# Patient Record
Sex: FEMALE | Race: WHITE | NOT HISPANIC OR LATINO | Employment: OTHER | URBAN - METROPOLITAN AREA
[De-identification: names, ages, dates, MRNs, and addresses within clinical notes are randomized per-mention and may not be internally consistent; named-entity substitution may affect disease eponyms.]

---

## 2017-01-31 ENCOUNTER — GENERIC CONVERSION - ENCOUNTER (OUTPATIENT)
Dept: OTHER | Facility: OTHER | Age: 82
End: 2017-01-31

## 2018-01-13 NOTE — MISCELLANEOUS
Message  pt  called; went to [facility] for Rx for Temazepam and was not there; I called and gave verbal order to the pharmacist for the 15 mg  strength, # 30 one every hs prn; I returned call to pt  to inform her Rx called in      Plan  Menopausal symptoms    · From  Temazepam 15 MG Oral Capsule TAKE 1 CAPSULE AT BEDTIME AS  NEEDED FOR SLEEP To Temazepam 15 MG Oral Capsule TAKE ONE CAPSULE BY  MOUTH AT BEDTIME AS NEEDED FOR SLEEP    Signatures   Electronically signed by : Blayne Berrios DO; May 16 2016  4:04PM EST                       (Author)

## 2018-01-15 NOTE — PROGRESS NOTES
Chief Complaint  The patient called in looking for a refill on her Temazepam and per Dr Mitchell Pinto the prescription was printed out for the patient to come   I had to leave a message for her to let her know that the prescription is ready to   Active Problems    1  Anemia (285 9) (D64 9)   2  Eye irritation (379 99) (H57 8)   3  Menopausal symptoms (627 2) (N95 1)   4  Osteoporosis (733 00) (M81 0)   5  Skin rash (782 1) (R21)   6  Urinary frequency (788 41) (R35 0)    Current Meds   1  Losartan Potassium 50 MG Oral Tablet; Therapy: 85BQA3726 to (Last Rx:82Swb5750)  Requested for: 43Lla1212 Ordered   2  Naproxen 500 MG Oral Tablet; Therapy: 99ZRT5032 to (Last Rx:19Nue4447)  Requested for: 28Xgp0750 Ordered   3  Promethazine VC/Codeine 6 25-5-10 MG/5ML Oral Syrup; TAKE 5 ML EVERY 4 TO 6   HOURS AS NEEDED; Therapy: 82PDN8833 to (Last Rx:43Lcf1934) Ordered   4  Sertraline HCl - 50 MG Oral Tablet; TAKE 1 TABLET DAILY AS DIRECTED; Therapy: 74TZL1411 to (Evaluate:29Dec2012)  Requested for: 89KJA4702; Last   Rx:29Nov2012 Ordered   5  Temazepam 15 MG Oral Capsule; TAKE ONE CAPSULE BY MOUTH AT BEDTIME AS   NEEDED FOR SLEEP; Therapy: 39VTC8596 to (Evaluate:31Jan2017); Last Rx:02Nov2016 Ordered   6  Triamcinolone Acetonide 0 1 % External Cream; APPLY  AND RUB  IN A THIN FILM TO   AFFECTED AREAS TWICE DAILY  (AM AND PM); Therapy: 06GRC3165 to (Last Rx:01Cct6529) Ordered    Allergies    1   Sulfa Antibiotics    Plan  Menopausal symptoms    · Temazepam 15 MG Oral Capsule; TAKE ONE CAPSULE BY MOUTH AT  BEDTIME AS NEEDED FOR SLEEP    Signatures   Electronically signed by : Yahir Chavez DO; Jan 31 2017  5:04PM EST                       (Author)

## 2018-01-25 RX ORDER — TEMAZEPAM 15 MG/1
1 CAPSULE ORAL
COMMUNITY
Start: 2015-11-18 | End: 2018-01-26 | Stop reason: SDUPTHER

## 2018-01-26 DIAGNOSIS — F51.01 PRIMARY INSOMNIA: Primary | ICD-10-CM

## 2018-01-26 RX ORDER — TEMAZEPAM 15 MG/1
15 CAPSULE ORAL
Qty: 30 CAPSULE | Refills: 0 | Status: SHIPPED | OUTPATIENT
Start: 2018-01-26 | End: 2018-02-28 | Stop reason: SDUPTHER

## 2018-01-26 RX ORDER — TEMAZEPAM 15 MG/1
15 CAPSULE ORAL
Qty: 30 CAPSULE | Refills: 1 | Status: CANCELLED | OUTPATIENT
Start: 2018-01-26

## 2018-02-28 DIAGNOSIS — F51.01 PRIMARY INSOMNIA: ICD-10-CM

## 2018-02-28 RX ORDER — TEMAZEPAM 15 MG/1
15 CAPSULE ORAL
Qty: 90 CAPSULE | Refills: 0 | Status: SHIPPED | OUTPATIENT
Start: 2018-02-28 | End: 2018-05-30 | Stop reason: SDUPTHER

## 2018-04-23 ENCOUNTER — OFFICE VISIT (OUTPATIENT)
Dept: UROLOGY | Facility: MEDICAL CENTER | Age: 83
End: 2018-04-23
Payer: COMMERCIAL

## 2018-04-23 VITALS
DIASTOLIC BLOOD PRESSURE: 68 MMHG | BODY MASS INDEX: 20.22 KG/M2 | SYSTOLIC BLOOD PRESSURE: 130 MMHG | HEIGHT: 60 IN | WEIGHT: 103 LBS

## 2018-04-23 DIAGNOSIS — N30.01 ACUTE CYSTITIS WITH HEMATURIA: Primary | ICD-10-CM

## 2018-04-23 LAB
SL AMB  POCT GLUCOSE, UA: NEGATIVE
SL AMB LEUKOCYTE ESTERASE,UA: NEGATIVE
SL AMB POCT BILIRUBIN,UA: NEGATIVE
SL AMB POCT BLOOD,UA: ABNORMAL
SL AMB POCT CLARITY,UA: CLEAR
SL AMB POCT COLOR,UA: YELLOW
SL AMB POCT KETONES,UA: NEGATIVE
SL AMB POCT NITRITE,UA: NEGATIVE
SL AMB POCT PH,UA: 5
SL AMB POCT SPECIFIC GRAVITY,UA: <=1.005
SL AMB POCT URINE PROTEIN: NEGATIVE
SL AMB POCT UROBILINOGEN: 0.2

## 2018-04-23 PROCEDURE — 81003 URINALYSIS AUTO W/O SCOPE: CPT | Performed by: UROLOGY

## 2018-04-23 PROCEDURE — 99213 OFFICE O/P EST LOW 20 MIN: CPT | Performed by: UROLOGY

## 2018-04-23 RX ORDER — UBIDECARENONE 75 MG
1 CAPSULE ORAL
COMMUNITY
Start: 2014-09-16

## 2018-04-23 RX ORDER — IPRATROPIUM BROMIDE 21 UG/1
SPRAY, METERED NASAL
COMMUNITY
Start: 2018-03-19

## 2018-04-23 RX ORDER — LISINOPRIL 20 MG/1
20 TABLET ORAL
COMMUNITY
Start: 2017-05-05 | End: 2019-08-09 | Stop reason: ALTCHOICE

## 2018-04-23 NOTE — PROGRESS NOTES
[address] [facility]  [facility]  [address] [facility], [address] [place] [address]  [phone]  www  [facility]  org      NAME: Timbo Berrios  AGE: 80 y o  SEX: female  : 1933   MRN: [id number]    DATE: 2018  TIME: 2:44 PM    Assessment and Plan:   Chronic cystitis with an episode of acute cystitis recently, now resolving  However this was quite symptomatically severe  As she is slowly getting better, we will simply watch and wait and I wish to see her in 3 months to check another urinalysis  If she still has given a slight amount of blood in her urine or she still has some symptoms, I will do a cystoscopy  She has not smoked for many years  Chief Complaint   No chief complaint on file  History of Present Illness   Chronic cystitis:  Urinalysis negative except for trace blood  MRI 2017 showed normal urinary tract  Had UTI 4/10/2018- E COli, sensitive to MCN and Cipro, which she was treated with both by Dr Carvin Baumgarten- took a while for it to resolve  Her symptoms were severe bladder burning, "chills" and frequency, and this is all calming down  Before all of this, she was doing quite well, last seen in   ALLERGIES: Sulfa       MEDICATIONS: Calcium + Vit D3 1 tablet PO Daily   Lisinopril 20 mg tablet 1 tablet PO Daily   Restoril 30 mg capsule 1 capsule PO Daily   Vitamin B12 1 tablet PO Daily         PSH: Cystoscopy -         PSH Notes: tooth extraction 2 yrs ago     NON- PSH: Hip Replacement, Right - about   Revise Hip Joint Replacement, Right - 2015  Sleeve Lobectomy -           PMH: Cystitis Chronic, Interstitial - 2015  Cystitis, Chronic - 2015, -   Dysuria, ?? CAUSE - BLADDER LOOKS NORMAL, UA CLEAR   MAY HAVE GYN ISSUE - 2015  Frequency of Urination - 2015, (Worsening), -   Urgency Of Urination (Worsening) -   Incomplete Bladder Emptying -   Hematuria, Microscopic - 2012, - 2012  Retention Of Urine Ot        PMH Notes: pt has open ended glaucoma  she called eye dr 6/3/14     NON- PMH: Chills (Without Fever)  Cough  Depression  Glaucoma, open angled glaucoma pt called her eye  6/3/2014/ Khalida Barnes  Headache  Hearing Loss Unspec  Hypertension  Lung Cancer  Skin Sensation Disturb       FAMILY HISTORY: Breast Cancer - Mother  Lung Cancer - Father     SOCIAL HISTORY: Marital Status:   Patient does not smoke anymore  Has not smoked since 9/18/2002  Smoked for 40 years  Does not use smokeless tobacco   Drinks 1 drink per day  Types of alcohol consumed: Beer  Does not use drugs  Drinks 1 caffeinated drink per day  Has not had a blood transfusion  The following portions of the patient's history were reviewed and updated as appropriate: allergies, current medications, past family history, past medical history, past social history, past surgical history and problem list     Review of Systems   Review of Systems    Active Problem List   There is no problem list on file for this patient  Objective   There were no vitals taken for this visit      Physical Exam        Current Medications     Current Outpatient Prescriptions:     temazepam (RESTORIL) 15 mg capsule, Take 1 capsule (15 mg total) by mouth daily at bedtime for 90 days, Disp: 90 capsule, Rfl: 0        Davie Owens MD

## 2018-04-23 NOTE — LETTER
2018     Referral [facility]    Patient: Mimi Guevara   YOB: 1933   Date of Visit: 2018       Dear Dr Herberth Donis:    Thank you for referring Danii Duke to me for evaluation  Below are my notes for this consultation  If you have questions, please do not hesitate to call me  I look forward to following your patient along with you  Sincerely,        Keshia Fabian MD        CC: MD Keshia Gilliam MD  2018  3:07 PM  Sign at close encounter  [address] [facility]  [address], [address] [place] [address]  [phone]  www  [facility]  org      NAME: Mimi Guevara  AGE: 80 y o  SEX: female  : 1933   MRN: [id number]    DATE: 2018  TIME: 2:44 PM    Assessment and Plan:   Chronic cystitis with an episode of acute cystitis recently, now resolving  However this was quite symptomatically severe  As she is slowly getting better, we will simply watch and wait and I wish to see her in 3 months to check another urinalysis  If she still has given a slight amount of blood in her urine or she still has some symptoms, I will do a cystoscopy  She has not smoked for many years  Chief Complaint   No chief complaint on file  History of Present Illness   Chronic cystitis:  Urinalysis negative except for trace blood  MRI 2017 showed normal urinary tract  Had UTI 4/10/2018- E COli, sensitive to MCN and Cipro, which she was treated with both by Dr Aissatou Tobias- took a while for it to resolve  Her symptoms were severe bladder burning, "chills" and frequency, and this is all calming down  Before all of this, she was doing quite well, last seen in 2016          ALLERGIES: Sulfa       MEDICATIONS: Calcium + Vit D3 1 tablet PO Daily   Lisinopril 20 mg tablet 1 tablet PO Daily   Restoril 30 mg capsule 1 capsule PO Daily   Vitamin B12 1 tablet PO Daily         PSH: Cystoscopy - 2012        [address] Notes: tooth extraction 2 yrs ago     NON- PSH: Hip Replacement, Right - about 2009  Revise Hip Joint Replacement, Right - 9/14/2015  Sleeve Lobectomy - 2002          PMH: Cystitis Chronic, Interstitial - 4/2/2015  Cystitis, Chronic - 4/2/2015, - 2012  Dysuria, ?? CAUSE - BLADDER LOOKS NORMAL, UA CLEAR  MAY HAVE GYN ISSUE - 1/2/2015  Frequency of Urination - 1/2/2015, (Worsening), - 2014  Urgency Of Urination (Worsening) - 2014  Incomplete Bladder Emptying - 2014  Hematuria, Microscopic - 2012, - 2012  Retention Of Urine Ot        PMH Notes: pt has open ended glaucoma  she called eye dr 6/3/14     NON- PMH: Chills (Without Fever)  Cough  Depression  Glaucoma, open angled glaucoma pt called her eye dr 6/3/2014/ Mevelyn Osmond  Headache  Hearing Loss Unspec  Hypertension  Lung Cancer  Skin Sensation Disturb       FAMILY HISTORY: Breast Cancer - Mother  Lung Cancer - Father     SOCIAL HISTORY: Marital Status:   Patient does not smoke anymore  Has not smoked since 9/18/2002  Smoked for 40 years  Does not use smokeless tobacco   Drinks 1 drink per day  Types of alcohol consumed: Beer  Does not use drugs  Drinks 1 caffeinated drink per day  Has not had a blood transfusion  The following portions of the patient's history were reviewed and updated as appropriate: allergies, current medications, past family history, past medical history, past social history, past surgical history and problem list     Review of Systems   Review of Systems    Active Problem List   There is no problem list on file for this patient  Objective   There were no vitals taken for this visit      Physical Exam        Current Medications     Current Outpatient Prescriptions:     temazepam (RESTORIL) 15 mg capsule, Take 1 capsule (15 mg total) by mouth daily at bedtime for 90 days, Disp: 90 capsule, Rfl: 0        Jabier Smith MD

## 2018-05-30 DIAGNOSIS — F51.01 PRIMARY INSOMNIA: ICD-10-CM

## 2018-05-30 RX ORDER — TEMAZEPAM 15 MG/1
15 CAPSULE ORAL
Qty: 90 CAPSULE | Refills: 0 | Status: SHIPPED | OUTPATIENT
Start: 2018-05-30 | End: 2018-05-30 | Stop reason: SDUPTHER

## 2018-05-30 RX ORDER — TEMAZEPAM 15 MG/1
15 CAPSULE ORAL
Qty: 90 CAPSULE | Refills: 0 | Status: SHIPPED | OUTPATIENT
Start: 2018-05-30 | End: 2018-08-29 | Stop reason: SDUPTHER

## 2018-08-24 DIAGNOSIS — F51.01 PRIMARY INSOMNIA: ICD-10-CM

## 2018-08-24 RX ORDER — TEMAZEPAM 15 MG/1
15 CAPSULE ORAL
Qty: 90 CAPSULE | Refills: 0 | OUTPATIENT
Start: 2018-08-24 | End: 2018-11-22

## 2018-08-27 DIAGNOSIS — F51.01 PRIMARY INSOMNIA: ICD-10-CM

## 2018-08-27 RX ORDER — TEMAZEPAM 15 MG/1
15 CAPSULE ORAL
Qty: 90 CAPSULE | Refills: 0 | OUTPATIENT
Start: 2018-08-27 | End: 2018-11-25

## 2018-08-29 DIAGNOSIS — F51.01 PRIMARY INSOMNIA: ICD-10-CM

## 2018-08-29 RX ORDER — TEMAZEPAM 15 MG/1
15 CAPSULE ORAL
Qty: 90 CAPSULE | Refills: 0 | Status: SHIPPED | OUTPATIENT
Start: 2018-08-29 | End: 2018-10-22 | Stop reason: SDUPTHER

## 2018-10-22 DIAGNOSIS — F51.01 PRIMARY INSOMNIA: ICD-10-CM

## 2018-10-23 DIAGNOSIS — F51.01 PRIMARY INSOMNIA: ICD-10-CM

## 2018-10-23 RX ORDER — TEMAZEPAM 15 MG/1
15 CAPSULE ORAL
Qty: 90 CAPSULE | Refills: 1 | Status: SHIPPED | OUTPATIENT
Start: 2018-10-23 | End: 2018-10-23 | Stop reason: SDUPTHER

## 2018-10-23 RX ORDER — TEMAZEPAM 15 MG/1
15 CAPSULE ORAL
Qty: 90 CAPSULE | Refills: 0 | Status: SHIPPED | OUTPATIENT
Start: 2018-10-23 | End: 2018-11-19 | Stop reason: SDUPTHER

## 2018-11-15 ENCOUNTER — OFFICE VISIT (OUTPATIENT)
Dept: UROLOGY | Facility: MEDICAL CENTER | Age: 83
End: 2018-11-15

## 2018-11-15 VITALS
HEIGHT: 60 IN | WEIGHT: 105 LBS | BODY MASS INDEX: 20.62 KG/M2 | SYSTOLIC BLOOD PRESSURE: 130 MMHG | DIASTOLIC BLOOD PRESSURE: 72 MMHG

## 2018-11-15 DIAGNOSIS — N30.01 ACUTE CYSTITIS WITH HEMATURIA: Primary | ICD-10-CM

## 2018-11-15 DIAGNOSIS — N30.20 CHRONIC CYSTITIS: ICD-10-CM

## 2018-11-15 LAB
SL AMB  POCT GLUCOSE, UA: ABNORMAL
SL AMB LEUKOCYTE ESTERASE,UA: ABNORMAL
SL AMB POCT BILIRUBIN,UA: ABNORMAL
SL AMB POCT BLOOD,UA: ABNORMAL
SL AMB POCT CLARITY,UA: CLEAR
SL AMB POCT COLOR,UA: YELLOW
SL AMB POCT KETONES,UA: ABNORMAL
SL AMB POCT NITRITE,UA: ABNORMAL
SL AMB POCT PH,UA: 6
SL AMB POCT SPECIFIC GRAVITY,UA: 1.01
SL AMB POCT URINE PROTEIN: ABNORMAL
SL AMB POCT UROBILINOGEN: 0.2

## 2018-11-15 PROCEDURE — 81003 URINALYSIS AUTO W/O SCOPE: CPT | Performed by: UROLOGY

## 2018-11-15 PROCEDURE — 99213 OFFICE O/P EST LOW 20 MIN: CPT | Performed by: UROLOGY

## 2018-11-15 RX ORDER — NITROFURANTOIN 25; 75 MG/1; MG/1
100 CAPSULE ORAL 2 TIMES DAILY
Qty: 14 CAPSULE | Refills: 0 | Status: SHIPPED | OUTPATIENT
Start: 2018-11-15 | End: 2019-10-03

## 2018-11-15 NOTE — LETTER
November 15, 2018     Kaleigh Davis MD  [address] [facility] [address]    Patient: Av Heller   YOB: 1933   Date of Visit: 11/15/2018       Dear Dr Busch Lines: Thank you for referring Dusty Amanda to me for evaluation  Below are my notes for this consultation  If you have questions, please do not hesitate to call me  I look forward to following your patient along with you  Sincerely,        Mikael Dudley MD        CC: No Recipients  Mikael Dudley MD  11/15/2018  1:21 PM  Sign at close encounter  [address] [facility]  [address]  [phone]  www  [facility]  org      NAME: Av Heller  AGE: 80 y o  SEX: female  : 1933   MRN: [id number]    DATE: 11/15/2018  TIME: 1:14 PM    Assessment and Plan:  Difficulty urinating after hip replacement 6 days ago  This may be related to the epidural itself, but she also may have a urinary tract infection  We are waiting on a culture from [place] [id number]  We will start her empirically on Macrodantin 100 mg p  O  B i d  For 7 days  Hopefully with this and time her urination pattern will improve back to its baseline  Chief Complaint   No chief complaint on file  History of Present Illness   Chronic cystitis- here for f/u wt recheck of urine  She had her hip replaced 6 days ago and she is having intermittent urinary retention  Eventually she is able to urinate but she finds herself unable to urinate at certain times  [facility]  During the surgery she had an epidural but she had no Pulido catheter  Urinalysis today shows large leukocyte esterase and trace blood        The following portions of the patient's history were reviewed and updated as appropriate: allergies, current medications, past family history, past medical history, past social history, past surgical history and problem list     Review of Systems   Review of Systems   Genitourinary: Positive for difficulty urinating  Active Problem List   There is no problem list on file for this patient        Objective   /72 (BP Location: Left arm, Patient Position: Sitting)   Ht 5' (1 524 m)   Wt 47 6 kg (105 lb)   BMI 20 51 kg/m²      Physical Exam        Current Medications     Current Outpatient Prescriptions:     Brinzolamide-Brimonidine (SIMBRINZA) 1-0 2 % SUSP, Apply 1 drop to eye, Disp: , Rfl:     cyanocobalamin (VITAMIN B-12) 100 mcg tablet, Take 1 mg by mouth, Disp: , Rfl:     FLUZONE HIGH-DOSE 0 5 ML JIM, , Disp: , Rfl:     losartan (COZAAR) 100 MG tablet, , Disp: , Rfl:     temazepam (RESTORIL) 15 mg capsule, Take 1 capsule (15 mg total) by mouth daily at bedtime for 90 days, Disp: 90 capsule, Rfl: 0    ciprofloxacin (CIPRO) 500 mg tablet, , Disp: , Rfl:     ipratropium (ATROVENT) 0 03 % nasal spray, , Disp: , Rfl:     lisinopril (ZESTRIL) 20 mg tablet, Take 20 mg by mouth, Disp: , Rfl:     magnesium hydroxide (MILK OF MAGNESIA) 400 mg/5 mL oral suspension, Take 30 mL by mouth daily, Disp: , Rfl:     nitrofurantoin (MACROBID) 100 mg capsule, , Disp: , Rfl:     sertraline (ZOLOFT) 50 mg tablet, TAKE ONE AND ONE-HALF TABLETS BY MOUTH ONCE DAILY , Disp: , Rfl:         Russ Longoria MD

## 2018-11-15 NOTE — PROGRESS NOTES
[address] [facility]  [facility]  [address] [facility]  [place], [address] [facility]  [phone]  www  [facility]  org      NAME: Vince Jean  AGE: 80 y o  SEX: female  : 1933   MRN: [id number]    DATE: 11/15/2018  TIME: 1:14 PM    Assessment and Plan:  Difficulty urinating after hip replacement 6 days ago  This may be related to the epidural itself, but she also may have a urinary tract infection  We are waiting on a culture from [place] [id number]  We will start her empirically on Macrodantin 100 mg p  O  B i d  For 7 days  Hopefully with this and time her urination pattern will improve back to its baseline  Chief Complaint   No chief complaint on file  History of Present Illness   Chronic cystitis- here for f/u wt recheck of urine  She had her hip replaced 6 days ago and she is having intermittent urinary retention  Eventually she is able to urinate but she finds herself unable to urinate at certain times  A urine culture was sent from [facility] yesterday  During the surgery she had an epidural but she had no Pulido catheter  Urinalysis today shows large leukocyte esterase and trace blood  The following portions of the patient's history were reviewed and updated as appropriate: allergies, current medications, past family history, past medical history, past social history, past surgical history and problem list     Review of Systems   Review of Systems   Genitourinary: Positive for difficulty urinating  Active Problem List   There is no problem list on file for this patient        Objective   /72 (BP Location: Left arm, Patient Position: Sitting)   Ht 5' (1 524 m)   Wt 47 6 kg (105 lb)   BMI 20 51 kg/m²     Physical Exam        Current Medications     Current Outpatient Prescriptions:     Brinzolamide-Brimonidine (SIMBRINZA) 1-0 2 % SUSP, Apply 1 drop to eye, Disp: , Rfl:     cyanocobalamin (VITAMIN B-12) 100 mcg tablet, Take 1 mg by mouth, Disp: , Rfl:     FLUZONE HIGH-DOSE 0 5 ML JIM, , Disp: , Rfl:     losartan (COZAAR) 100 MG tablet, , Disp: , Rfl:     temazepam (RESTORIL) 15 mg capsule, Take 1 capsule (15 mg total) by mouth daily at bedtime for 90 days, Disp: 90 capsule, Rfl: 0    ciprofloxacin (CIPRO) 500 mg tablet, , Disp: , Rfl:     ipratropium (ATROVENT) 0 03 % nasal spray, , Disp: , Rfl:     lisinopril (ZESTRIL) 20 mg tablet, Take 20 mg by mouth, Disp: , Rfl:     magnesium hydroxide (MILK OF MAGNESIA) 400 mg/5 mL oral suspension, Take 30 mL by mouth daily, Disp: , Rfl:     nitrofurantoin (MACROBID) 100 mg capsule, , Disp: , Rfl:     sertraline (ZOLOFT) 50 mg tablet, TAKE ONE AND ONE-HALF TABLETS BY MOUTH ONCE DAILY , Disp: , Rfl:         Delphine Lira MD

## 2018-11-19 DIAGNOSIS — F51.01 PRIMARY INSOMNIA: ICD-10-CM

## 2018-11-20 RX ORDER — TEMAZEPAM 15 MG/1
15 CAPSULE ORAL
Qty: 90 CAPSULE | Refills: 0 | Status: SHIPPED | OUTPATIENT
Start: 2018-11-20 | End: 2019-08-09 | Stop reason: ALTCHOICE

## 2019-08-09 ENCOUNTER — ANNUAL EXAM (OUTPATIENT)
Dept: OBGYN CLINIC | Facility: MEDICAL CENTER | Age: 84
End: 2019-08-09
Payer: COMMERCIAL

## 2019-08-09 VITALS
HEIGHT: 60 IN | SYSTOLIC BLOOD PRESSURE: 120 MMHG | BODY MASS INDEX: 19.48 KG/M2 | DIASTOLIC BLOOD PRESSURE: 80 MMHG | WEIGHT: 99.2 LBS

## 2019-08-09 DIAGNOSIS — Z01.419 ENCNTR FOR GYN EXAM (GENERAL) (ROUTINE) W/O ABN FINDINGS: Primary | ICD-10-CM

## 2019-08-09 DIAGNOSIS — Z12.31 ENCOUNTER FOR SCREENING MAMMOGRAM FOR MALIGNANT NEOPLASM OF BREAST: ICD-10-CM

## 2019-08-09 PROCEDURE — S0612 ANNUAL GYNECOLOGICAL EXAMINA: HCPCS | Performed by: OBSTETRICS & GYNECOLOGY

## 2019-08-09 RX ORDER — TEMAZEPAM 30 MG/1
30 CAPSULE ORAL DAILY PRN
COMMUNITY
Start: 2019-04-01

## 2019-08-09 NOTE — PROGRESS NOTES
ASSESSMENT & PLAN: Heraclio Stringer was seen today for gynecologic exam     Diagnoses and all orders for this visit:    Encntr for gyn exam (general) (routine) w/o abn findings    Encounter for screening mammogram for malignant neoplasm of breast  -     Mammo screening bilateral w 3d & cad; Future    Discussion/Summary:  Patient here for yearly gyn preventive exam; doing well  1   Routine well woman exam done today  2   Pap and HPV:  Pap and cotesting was not done today  Current ASCCP Guidelines reviewed  3   Mammogram was ordered  4   Colonoscopy is up to date  5   DEXA is up to date  DEXA was not ordered today  6  The following were reviewed in today's visit: breast self exam, adequate intake of calcium and vitamin D, exercise and healthy diet  7   F/u 1year  CC:  Annual Gynecologic Examination    HPI: Gildardo Valladares is a 80 y o  who presents for annual gynecologic examination  She has the following concerns:  none    Health Maintenance:    Patient describes her health as good  Patient does not have weight concerns  She exercises 7 days per week with house work and gardening  She does wear her seatbelt routinely  She does perform regular monthly self breast exams  She does feel safe at home  Patients does not follow a  diet  Patient gets 5 servings of dairy or calcium rich foods daily  Last pap: age 72  Last mammogram: 2018  Last colonoscopy: unknown date    There is no problem list on file for this patient  Past Medical History:   Diagnosis Date    Lung cancer [facility])        Past Surgical History:   Procedure Laterality Date    LUNG LOBECTOMY Left 06/2001    TOTAL HIP ARTHROPLASTY Right 04/2016       Past OB/Gyn History:    Patient is not currently sexually active  No family history on file  Social History:   Social History     Socioeconomic History    Marital status:       Spouse name: Not on file    Number of children: Not on file    Years of education: Not on file  Highest education level: Not on file   Occupational History    Not on file   Social Needs    Financial resource strain: Not on file    Food insecurity:     Worry: Not on file     Inability: Not on file    Transportation needs:     Medical: Not on file     Non-medical: Not on file   Tobacco Use    Smoking status: Former Smoker    Smokeless tobacco: Never Used   Substance and Sexual Activity    Alcohol use: Yes     Alcohol/week: 1 0 standard drinks     Types: 1 Cans of beer per week    Drug use: No    Sexual activity: Not on file   Lifestyle    Physical activity:     Days per week: Not on file     Minutes per session: Not on file    Stress: Not on file   Relationships    Social connections:     Talks on phone: Not on file     Gets together: Not on file     Attends [employer] service: Not on file     Active member of club or organization: Not on file     Attends meetings of clubs or organizations: Not on file     Relationship status: Not on file    Intimate partner violence:     Fear of current or ex partner: Not on file     Emotionally abused: Not on file     Physically abused: Not on file     Forced sexual activity: Not on file   Other Topics Concern    Not on file   Social History Narrative    Not on file     Presently lives with 80 y o  high school school classmate from [employer]  Patient is currently retired       Allergies   Allergen Reactions    Pollen Extract Itching    Sulfa Antibiotics Itching         Current Outpatient Medications:     temazepam (RESTORIL) 30 mg capsule, Take 30 mg by mouth daily as needed, Disp: , Rfl:     Brinzolamide-Brimonidine (SIMBRINZA) 1-0 2 % SUSP, Apply 1 drop to eye, Disp: , Rfl:     cyanocobalamin (VITAMIN B-12) 100 mcg tablet, Take 1 mg by mouth, Disp: , Rfl:     FLUZONE HIGH-DOSE 0 5 ML JIM, , Disp: , Rfl:     ipratropium (ATROVENT) 0 03 % nasal spray, , Disp: , Rfl:     magnesium hydroxide (MILK OF MAGNESIA) 400 mg/5 mL oral suspension, Take 30 mL by mouth daily, Disp: , Rfl:     nitrofurantoin (MACROBID) 100 mg capsule, Take 1 capsule (100 mg total) by mouth 2 (two) times a day, Disp: 14 capsule, Rfl: 0    sertraline (ZOLOFT) 50 mg tablet, TAKE ONE AND ONE-HALF TABLETS BY MOUTH ONCE DAILY , Disp: , Rfl:     Review of Systems  Constitutional :no fever, feels well, no tiredness, no recent weight gain or loss  ENT: no ear ache, no loss of hearing, no nosebleeds or nasal discharge, no sore throat or hoarseness  Cardiovascular: no complaints of slow or fast heart beat, no chest pain, no palpitations, no leg claudication or lower extremity edema  Respiratory: no complaints of shortness of shortness of breath, no ESCOBAR  Breasts:no complaints of breast pain, breast lump, or nipple discharge  Gastrointestinal: no complaints of abdominal pain, constipation, nausea, vomiting, or diarrhea or bloody stools  Genitourinary : no complaints of dysuria, incontinence, pelvic pain, no dysmenorrhea, vaginal discharge or abnormal vaginal bleeding and as noted in HPI  Musculoskeletal: no complaints of arthralgia, no myalgia, no joint swelling or stiffness, no limb pain or swelling  Integumentary: no complaints of skin rash or lesion, itching or dry skin  Neurological: no complaints of headache, no confusion, no numbness or tingling, no dizziness or fainting     Physical Exam:   /80   Ht 5' (1 524 m)   Wt 45 kg (99 lb 3 2 oz)   LMP  (LMP Unknown)   Breastfeeding? No   BMI 19 37 kg/m²     General: appears stated age, cooperative, alert normal mood and affect   Psychiatric oriented to person, place and time  Mood and affect normal   Neck: normal, supple,trachea midline, no masses    Thyroid: normal, no thyromegaly   Heart: regular rate and rhythm, S1, S2 normal, no murmur, click, rub or gallop   Lungs: clear to auscultation bilaterally, no increased work of breathing or signs of respiratory distress   Breasts: normal, no dimpling or skin changes noted   Abdomen: soft, non-tender, without masses or organomegaly   Vulva: normal , no lesions   Vagina: normal , no lesions or dryness   Urethra: normal   Urethal meatus normal   Bladder Normal, soft, non-tender and no prolapse or masses appreciated   Cervix: normal, no palpable masses    Uterus: normal , non-tender, not enlarged, no palpable masses   Adnexa: normal, non-tender without fullness or masses; hemoccult neg  Lymphatic Palpation of lymph nodes in neck, axilla, groin and/or other locations: no lymphadenopathy or masses noted   Skin Normal skin turgor and no rashes    Palpation of skin and subcutaneous tissue normal

## 2019-10-03 ENCOUNTER — OFFICE VISIT (OUTPATIENT)
Dept: UROLOGY | Facility: MEDICAL CENTER | Age: 84
End: 2019-10-03
Payer: COMMERCIAL

## 2019-10-03 VITALS
BODY MASS INDEX: 19.12 KG/M2 | HEIGHT: 60 IN | WEIGHT: 97.4 LBS | SYSTOLIC BLOOD PRESSURE: 112 MMHG | DIASTOLIC BLOOD PRESSURE: 64 MMHG | HEART RATE: 89 BPM

## 2019-10-03 DIAGNOSIS — R39.9 UTI SYMPTOMS: Primary | ICD-10-CM

## 2019-10-03 LAB
BACTERIA UR QL AUTO: NORMAL /HPF
BILIRUB UR QL STRIP: NEGATIVE
CLARITY UR: CLEAR
COLOR UR: YELLOW
GLUCOSE UR STRIP-MCNC: NEGATIVE MG/DL
HGB UR QL STRIP.AUTO: NEGATIVE
HYALINE CASTS #/AREA URNS LPF: NORMAL /LPF
KETONES UR STRIP-MCNC: NEGATIVE MG/DL
LEUKOCYTE ESTERASE UR QL STRIP: NEGATIVE
NITRITE UR QL STRIP: NEGATIVE
NON-SQ EPI CELLS URNS QL MICRO: NORMAL /HPF
PH UR STRIP.AUTO: 6 [PH]
PROT UR STRIP-MCNC: NEGATIVE MG/DL
RBC #/AREA URNS AUTO: NORMAL /HPF
SP GR UR STRIP.AUTO: 1.01 (ref 1–1.03)
UROBILINOGEN UR QL STRIP.AUTO: 0.2 E.U./DL
WBC #/AREA URNS AUTO: NORMAL /HPF

## 2019-10-03 PROCEDURE — 87086 URINE CULTURE/COLONY COUNT: CPT | Performed by: PHYSICIAN ASSISTANT

## 2019-10-03 PROCEDURE — 81001 URINALYSIS AUTO W/SCOPE: CPT | Performed by: PHYSICIAN ASSISTANT

## 2019-10-03 PROCEDURE — 99213 OFFICE O/P EST LOW 20 MIN: CPT | Performed by: PHYSICIAN ASSISTANT

## 2019-10-03 RX ORDER — NITROFURANTOIN 25; 75 MG/1; MG/1
100 CAPSULE ORAL 2 TIMES DAILY
COMMUNITY
End: 2019-10-29

## 2019-10-03 RX ORDER — MELATONIN
1000 DAILY
COMMUNITY

## 2019-10-03 NOTE — PROGRESS NOTES
10/3/2019      Chief Complaint   Patient presents with    Urinary Tract Infection       Assessment and Plan    80 y o  female managed by Dr Skyla Levy    1  Dysuria  - patient was on Keflex x 8 days and saw no benefit so they switched Macorbid Tuesday, still no improvement  - will obtain urine culture to ensure she does not have a multidrug resistant UTI, will continue on Macrobid in the meantime  - if so, will treat appropriately  - if not, will have to consider ultrasound to rule out any structural abnormalities to account for the patient's symptoms, can also treat symptomatically with overactive bladder medicines  - educated the patient to call with any worsening signs or symptoms of infection including but not limited to nausea, vomiting, fever, chills, and gross hematuria    Will call the patient with the results of her urine testing and appropriate follow-up      History of Present Illness  Anuel Arriola is a 80 y o  female here for a possible urinary tract infection  The patient was having dysuria and frequency so she was started on Keflex  This was ineffective after 1 weeks with a switch her to [address]  Unfortunately, the patient did not have a urine culture  She continues with dysuria and frequency  Denies any nausea, vomiting, fever, or chills  She does state that she has had headaches recently  Otherwise, doing well  She has had urinary tract infections in the past       Review of Systems   Constitutional: Negative for activity change, chills and fever  Gastrointestinal: Negative for abdominal distention and abdominal pain  Musculoskeletal: Negative for back pain and gait problem  Psychiatric/Behavioral: Negative for behavioral problems and confusion           Past Medical History  Past Medical History:   Diagnosis Date    Lung cancer [facility])        Past Social History  Past Surgical History:   Procedure Laterality Date    LUNG LOBECTOMY Left 06/2001    TOTAL HIP ARTHROPLASTY Right 04/2016 Social History     Tobacco Use   Smoking Status Former Smoker   Smokeless Tobacco Never Used       Past Family History  History reviewed  No pertinent family history  Past Social history  Social History     Socioeconomic History    Marital status:      Spouse name: Not on file    Number of children: Not on file    Years of education: Not on file    Highest education level: Not on file   Occupational History    Not on file   Social Needs    Financial resource strain: Not on file    Food insecurity:     Worry: Not on file     Inability: Not on file    Transportation needs:     Medical: Not on file     Non-medical: Not on file   Tobacco Use    Smoking status: Former Smoker    Smokeless tobacco: Never Used   Substance and Sexual Activity    Alcohol use:  Yes     Alcohol/week: 1 0 standard drinks     Types: 1 Cans of beer per week    Drug use: No    Sexual activity: Not on file   Lifestyle    Physical activity:     Days per week: Not on file     Minutes per session: Not on file    Stress: Not on file   Relationships    Social connections:     Talks on phone: Not on file     Gets together: Not on file     Attends Baptism service: Not on file     Active member of club or organization: Not on file     Attends meetings of clubs or organizations: Not on file     Relationship status: Not on file    Intimate partner violence:     Fear of current or ex partner: Not on file     Emotionally abused: Not on file     Physically abused: Not on file     Forced sexual activity: Not on file   Other Topics Concern    Not on file   Social History Narrative    Not on file       Current Medications  Current Outpatient Medications   Medication Sig Dispense Refill    Brinzolamide-Brimonidine (SIMBRINZA) 1-0 2 % SUSP Apply 1 drop to eye      cholecalciferol (VITAMIN D3) 1,000 units tablet Take 1,000 Units by mouth daily      cyanocobalamin (VITAMIN B-12) 100 mcg tablet Take 1 mg by mouth      FLUZONE HIGH-DOSE 0 5 ML JIM       ipratropium (ATROVENT) 0 03 % nasal spray       nitrofurantoin (MACROBID) 100 mg capsule Take 100 mg by mouth 2 (two) times a day      sertraline (ZOLOFT) 50 mg tablet TAKE ONE AND ONE-HALF TABLETS BY MOUTH ONCE DAILY       temazepam (RESTORIL) 30 mg capsule Take 30 mg by mouth daily as needed       No current facility-administered medications for this visit  Allergies  Allergies   Allergen Reactions    Pollen Extract Itching    Sulfa Antibiotics Itching         The following portions of the patient's history were reviewed and updated as appropriate: allergies, current medications, past medical history, past social history, past surgical history and problem list       Vitals  Vitals:    10/03/19 1250   BP: 112/64   BP Location: Left arm   Patient Position: Sitting   Cuff Size: Standard   Pulse: 89   Weight: 44 2 kg (97 lb 6 4 oz)   Height: 5' (1 524 m)       Physical Exam  Constitutional   General appearance: Patient is seated and in no acute distress, well appearing and well nourished  Head and Face   Head and face: Normal     Eyes   Conjunctiva and lids: No erythema, swelling or discharge  Ears, Nose, Mouth, and Throat   Hearing: Normal     Pulmonary   Respiratory effort: No increased work of breathing or signs of respiratory distress  Cardiovascular   Examination of extremities for edema and/or varicosities: Normal     Abdomen   Abdomen: Non-tender, no masses  Musculoskeletal   Gait and station: Normal     Skin   Skin and subcutaneous tissue: Warm, dry, and intact  No visible lesions or rashes  Psychiatric   Judgment and insight: Normal  Recent and remote memory:  Normal  Mood and affect: Normal      Results  No results found for this or any previous visit (from the past 1 hour(s))  ]  No results found for: PSA  No results found for: GLUCOSE, CALCIUM, NA, K, CO2, CL, BUN, CREATININE  No results found for: WBC, HGB, HCT, MCV, PLT      Orders  No orders of the defined types were placed in this encounter

## 2019-10-04 LAB — BACTERIA UR CULT: NORMAL

## 2019-10-07 ENCOUNTER — TELEPHONE (OUTPATIENT)
Dept: UROLOGY | Facility: MEDICAL CENTER | Age: 84
End: 2019-10-07

## 2019-10-07 NOTE — TELEPHONE ENCOUNTER
Patient seen by Maia Gomez on 10/03  The culture taken at that time has come back negative, but the patient is still symptomatic  Please advise

## 2019-10-07 NOTE — TELEPHONE ENCOUNTER
Patient seen by Dr Sravanthi Griffin at [facility] office  Patient called to report still experiencing chills, feeling UTI symptoms  Please advise    She can be reached at [phone]  Thank you

## 2019-10-08 NOTE — TELEPHONE ENCOUNTER
Spoke to pt, she had completed two courses of antibiotics, Keflex and  Macrobid  Gaynel Kenny was completed on 10/7  Cultures and UA were performed while pt was on antibiotics  Still c/o dysuria and freq and headache  Temp is 97 2  Pt instructed to keep 10/10 appt with Dr Perico Velazquez to be evaluated

## 2019-10-08 NOTE — TELEPHONE ENCOUNTER
Spoke to pt  States she has freq, dysuria  Feels as if she is not emptying  Pt's culture negative  Requesting earlier appt to be evaluated  Scheduled with Dr Carline Solares tomorrow at 11:30

## 2019-10-08 NOTE — TELEPHONE ENCOUNTER
Patient advised that she is not emptying her bladder and she is still having chills  Patient would like to be see sooner  Please advise

## 2019-10-09 ENCOUNTER — OFFICE VISIT (OUTPATIENT)
Dept: UROLOGY | Facility: MEDICAL CENTER | Age: 84
End: 2019-10-09
Payer: COMMERCIAL

## 2019-10-09 VITALS
DIASTOLIC BLOOD PRESSURE: 78 MMHG | HEART RATE: 70 BPM | SYSTOLIC BLOOD PRESSURE: 98 MMHG | TEMPERATURE: 98.1 F | WEIGHT: 97 LBS | BODY MASS INDEX: 19.04 KG/M2 | HEIGHT: 60 IN

## 2019-10-09 DIAGNOSIS — R39.14 FEELING OF INCOMPLETE BLADDER EMPTYING: ICD-10-CM

## 2019-10-09 DIAGNOSIS — R30.0 DYSURIA: Primary | ICD-10-CM

## 2019-10-09 LAB
POST-VOID RESIDUAL VOLUME, ML POC: 70 ML
SL AMB  POCT GLUCOSE, UA: ABNORMAL
SL AMB LEUKOCYTE ESTERASE,UA: ABNORMAL
SL AMB POCT BILIRUBIN,UA: ABNORMAL
SL AMB POCT BLOOD,UA: ABNORMAL
SL AMB POCT CLARITY,UA: CLEAR
SL AMB POCT COLOR,UA: YELLOW
SL AMB POCT KETONES,UA: ABNORMAL
SL AMB POCT NITRITE,UA: ABNORMAL
SL AMB POCT PH,UA: 5.5
SL AMB POCT SPECIFIC GRAVITY,UA: 1.01
SL AMB POCT URINE PROTEIN: ABNORMAL
SL AMB POCT UROBILINOGEN: 0.2

## 2019-10-09 PROCEDURE — 99214 OFFICE O/P EST MOD 30 MIN: CPT | Performed by: UROLOGY

## 2019-10-09 PROCEDURE — 51798 US URINE CAPACITY MEASURE: CPT | Performed by: UROLOGY

## 2019-10-09 PROCEDURE — 81003 URINALYSIS AUTO W/O SCOPE: CPT | Performed by: UROLOGY

## 2019-10-09 RX ORDER — TAMSULOSIN HYDROCHLORIDE 0.4 MG/1
0.4 CAPSULE ORAL
Qty: 90 CAPSULE | Refills: 3 | Status: SHIPPED | OUTPATIENT
Start: 2019-10-09 | End: 2019-10-29

## 2019-10-09 RX ORDER — ESTRADIOL 0.1 MG/G
2 CREAM VAGINAL DAILY
Qty: 60 G | Refills: 1 | Status: SHIPPED | OUTPATIENT
Start: 2019-10-09

## 2019-10-09 NOTE — PROGRESS NOTES
Assessment/Plan:  1  Dysuria-the patient actually does not have significant dysuria but does have burning in the vaginal region not necessarily related to urination  More than likely this is related to atrophic vaginitis  Estrace cream will be ordered and if this is helpful this will be followed in the future by Dr Maryana Becerril the patient's GYN  2   Feelings of incomplete bladder emptying -last PVR was 70 cc  Trial of Flomax will be ordered with the patient having remote history of a sulfa allergy but she was is to try this medication in order to improve her flow  She does note feelings of incomplete emptying and when she does void hers flow is very very slow  Eventual cystourethroscopy will be recommend    No problem-specific Assessment & Plan notes found for this encounter  Diagnoses and all orders for this visit:    Dysuria  -     POCT urine dip auto non-scope  -     estradiol (ESTRACE) 0 1 mg/g vaginal cream; Insert 2 g into the vagina daily    Feeling of incomplete bladder emptying  -     POCT Measure PVR  -     Cystoscopy; Future  -     POCT Measure PVR; Future  -     Urine culture; Future  -     tamsulosin (FLOMAX) 0 4 mg; Take 1 capsule (0 4 mg total) by mouth daily with dinner          Subjective:      Patient ID: Brandt Bui is a 80 y o  female  HPI  24-year-old female presents with chronic burning in the vaginal region with feelings of incomplete bladder emptying  She was previously treated for a bout of acute cystitis however urine culture did not show any evidence of significant bacterial growth  She notes a weakened urinary flow some urinary urgency and requests treatment for that  The following portions of the patient's history were reviewed and updated as appropriate: allergies, current medications, past family history, past medical history, past social history, past surgical history and problem list     Review of Systems   Gastrointestinal: Positive for constipation     All other systems reviewed and are negative  Objective:      BP 98/78   Pulse 70   Temp 98 1 °F (36 7 °C)   Ht 5' (1 524 m)   Wt 44 kg (97 lb)   LMP  (LMP Unknown)   BMI 18 94 kg/m²          Physical Exam   Constitutional: She is oriented to person, place, and time  She appears well-developed and well-nourished  No distress  HENT:   Head: Normocephalic and atraumatic  Eyes: EOM are normal    Neck: Neck supple  Pulmonary/Chest: Effort normal  No respiratory distress  Abdominal: Soft  She exhibits no distension  Neurological: She is alert and oriented to person, place, and time  Psychiatric: She has a normal mood and affect  Her behavior is normal  Judgment and thought content normal    Vitals reviewed

## 2019-10-15 ENCOUNTER — TELEPHONE (OUTPATIENT)
Dept: UROLOGY | Facility: MEDICAL CENTER | Age: 84
End: 2019-10-15

## 2019-10-15 DIAGNOSIS — N32.81 OVERACTIVE BLADDER: Primary | ICD-10-CM

## 2019-10-15 RX ORDER — OXYBUTYNIN CHLORIDE 10 MG/1
10 TABLET, EXTENDED RELEASE ORAL
Qty: 30 TABLET | Refills: 1 | Status: SHIPPED | OUTPATIENT
Start: 2019-10-15 | End: 2019-10-29

## 2019-10-15 NOTE — TELEPHONE ENCOUNTER
Spoke to pt  Still has pressure, dysuria and had 3 episodes of incontinence this afternoon  States urine is malodorous  Denies fever  Will go for culture tomorrow [facility]  PVR done 10/9 70 cc's  Pt began Estrace and Tamsulosin on 10/9  Pt is scheduled for office cysto 11/14  Will direct to Dr Valery Doe for additional orders

## 2019-10-15 NOTE — TELEPHONE ENCOUNTER
Per dr Yoon Li pt to d/c Tamsulosin  Pt to start Ditropan XL daily  Dr Yoon Li to send to pharmacy  Pt instructed it may cause constipation, dry mouth, blurred vision  Pt instructed to start stool softener and increase fiber in diet

## 2019-10-15 NOTE — TELEPHONE ENCOUNTER
Patient of Dr Mio Watkins last seen by Dr Ana Cristina Boles  Patient would like a return call from clinical regarding severe burning, lots of pressure, and no control of her bladder  She can be reached at [phone]

## 2019-10-16 ENCOUNTER — APPOINTMENT (OUTPATIENT)
Dept: LAB | Facility: MEDICAL CENTER | Age: 84
End: 2019-10-16
Attending: UROLOGY
Payer: COMMERCIAL

## 2019-10-16 DIAGNOSIS — R39.14 FEELING OF INCOMPLETE BLADDER EMPTYING: ICD-10-CM

## 2019-10-16 PROCEDURE — 87186 SC STD MICRODIL/AGAR DIL: CPT

## 2019-10-16 PROCEDURE — 87086 URINE CULTURE/COLONY COUNT: CPT

## 2019-10-16 PROCEDURE — 87077 CULTURE AEROBIC IDENTIFY: CPT

## 2019-10-17 ENCOUNTER — TELEPHONE (OUTPATIENT)
Dept: UROLOGY | Facility: MEDICAL CENTER | Age: 84
End: 2019-10-17

## 2019-10-17 DIAGNOSIS — R39.9 UTI SYMPTOMS: Primary | ICD-10-CM

## 2019-10-17 RX ORDER — CEPHALEXIN 500 MG/1
500 CAPSULE ORAL EVERY 12 HOURS SCHEDULED
Qty: 14 CAPSULE | Refills: 0 | Status: SHIPPED | OUTPATIENT
Start: 2019-10-17 | End: 2019-10-24

## 2019-10-17 NOTE — TELEPHONE ENCOUNTER
Spoke with pt and gave her message as written  She understands all instructions   ----- Message from Doreen Jackson MD sent at 10/17/2019 10:16 AM EDT -----  Urine culture is positive in the patient having dysuria  Although sensitivities are not yet back in wish to start Kefllex 500 mg p o  b i d  for 1 week    Let the patient know that if that sensitivities show resistance of this antibiotic we will call and change the prescription but at the present time I wanted to get her started

## 2019-10-18 ENCOUNTER — TELEPHONE (OUTPATIENT)
Dept: OBGYN CLINIC | Facility: MEDICAL CENTER | Age: 84
End: 2019-10-18

## 2019-10-18 LAB — BACTERIA UR CULT: ABNORMAL

## 2019-10-18 NOTE — TELEPHONE ENCOUNTER
Pt was on an antibiotic and now has a rash down below  She has tried Vaseline and its not working  She would like if you could call something in for her and call her  Thank you!

## 2019-10-29 ENCOUNTER — OFFICE VISIT (OUTPATIENT)
Dept: OBGYN CLINIC | Facility: MEDICAL CENTER | Age: 84
End: 2019-10-29
Payer: COMMERCIAL

## 2019-10-29 VITALS
HEIGHT: 60 IN | DIASTOLIC BLOOD PRESSURE: 70 MMHG | TEMPERATURE: 99.2 F | SYSTOLIC BLOOD PRESSURE: 120 MMHG | WEIGHT: 99 LBS | BODY MASS INDEX: 19.44 KG/M2

## 2019-10-29 DIAGNOSIS — N76.2 ACUTE VULVITIS: Primary | ICD-10-CM

## 2019-10-29 DIAGNOSIS — R30.0 BURNING WITH URINATION: ICD-10-CM

## 2019-10-29 DIAGNOSIS — N89.8 VAGINAL IRRITATION: ICD-10-CM

## 2019-10-29 LAB
SL AMB  POCT GLUCOSE, UA: NEGATIVE
SL AMB LEUKOCYTE ESTERASE,UA: ABNORMAL
SL AMB POCT BILIRUBIN,UA: NEGATIVE
SL AMB POCT BLOOD,UA: ABNORMAL
SL AMB POCT CLARITY,UA: ABNORMAL
SL AMB POCT COLOR,UA: YELLOW
SL AMB POCT KETONES,UA: NEGATIVE
SL AMB POCT NITRITE,UA: NEGATIVE
SL AMB POCT PH,UA: 5
SL AMB POCT SPECIFIC GRAVITY,UA: 1.01
SL AMB POCT URINE PROTEIN: ABNORMAL
SL AMB POCT UROBILINOGEN: NEGATIVE

## 2019-10-29 PROCEDURE — 81002 URINALYSIS NONAUTO W/O SCOPE: CPT | Performed by: OBSTETRICS & GYNECOLOGY

## 2019-10-29 PROCEDURE — 99213 OFFICE O/P EST LOW 20 MIN: CPT | Performed by: OBSTETRICS & GYNECOLOGY

## 2019-10-29 NOTE — PROGRESS NOTES
Amy Marrero was seen today for vaginitis  Diagnoses and all orders for this visit:    Acute vulvitis    Burning with urination  -     Urine culture  -     POCT urine dip  -     UA (URINE) with reflex to Microscopic    Vaginal irritation  -     Genital Comprehensive Culture    Discussion/Summary:  Patient here for external genital irritation; will be having a cystoscopy on 11/12/2019; had short term antibiotic; urine specimen provided and was sent for analysis  Also genital culture was sent  Seen with p a  Student, Demi Juarez  Plan; will heed cultures; advised fluid I take, azo dye tablets or cranberry tablets daily; also advised to use estrogen cream externally and daily  Will notify her of results  Subjective   Phillip Aburto is a 80 y o  female here for a problem visit  Patient is complaining of genital and bladder irritation  Sx's started 2 weeks ago  Patient reports that sx's are not related to her menses  There is no problem list on file for this patient  Gynecologic History  No LMP recorded (lmp unknown)  Patient is postmenopausal   The current method of family planning is post menopausal status  Past Medical History:   Diagnosis Date    Lung [facility])      Past Surgical History:   Procedure Laterality Date    LUNG LOBECTOMY Left 06/2001    TOTAL HIP ARTHROPLASTY Right 04/2016     No family history on file  Social History     Socioeconomic History    Marital status:       Spouse name: Not on file    Number of children: Not on file    Years of education: Not on file    Highest education level: Not on file   Occupational History    Not on file   Social Needs    Financial resource strain: Not on file    Food insecurity:     Worry: Not on file     Inability: Not on file    Transportation needs:     Medical: Not on file     Non-medical: Not on file   Tobacco Use    Smoking status: Former Smoker    Smokeless tobacco: Never Used   Substance and Sexual Activity    Alcohol use: Yes     Alcohol/week: 1 0 standard drinks     Types: 1 Cans of beer per week    Drug use: No    Sexual activity: Not on file   Lifestyle    Physical activity:     Days per week: Not on file     Minutes per session: Not on file    Stress: Not on file   Relationships    Social connections:     Talks on phone: Not on file     Gets together: Not on file     Attends [employer] service: Not on file     Active member of club or organization: Not on file     Attends meetings of clubs or organizations: Not on file     Relationship status: Not on file    Intimate partner violence:     Fear of current or ex partner: Not on file     Emotionally abused: Not on file     Physically abused: Not on file     Forced sexual activity: Not on file   Other Topics Concern    Not on file   Social History Narrative    Not on file     Allergies   Allergen Reactions    Pollen Extract Itching    Sulfa Antibiotics Itching       Current Outpatient Medications:     Brinzolamide-Brimonidine (SIMBRINZA) 1-0 2 % SUSP, Apply 1 drop to eye, Disp: , Rfl:     cholecalciferol (VITAMIN D3) 1,000 units tablet, Take 1,000 Units by mouth daily, Disp: , Rfl:     cyanocobalamin (VITAMIN B-12) 100 mcg tablet, Take 1 mg by mouth, Disp: , Rfl:     estradiol (ESTRACE) 0 1 mg/g vaginal cream, Insert 2 g into the vagina daily, Disp: 60 g, Rfl: 1    FLUZONE HIGH-DOSE 0 5 ML JIM, , Disp: , Rfl:     ipratropium (ATROVENT) 0 03 % nasal spray, , Disp: , Rfl:     sertraline (ZOLOFT) 50 mg tablet, TAKE ONE AND ONE-HALF TABLETS BY MOUTH ONCE DAILY , Disp: , Rfl:     temazepam (RESTORIL) 30 mg capsule, Take 30 mg by mouth daily as needed, Disp: , Rfl:     Review of Systems  Constitutional :no fever, feels well, no tiredness, no recent weight gain or loss  ENT: no ear ache, no loss of hearing, no nosebleeds or nasal discharge, no sore throat or hoarseness    Cardiovascular: no complaints of slow or fast heart beat, no chest pain, no palpitations, no leg claudication or lower extremity edema  Respiratory: no complaints of shortness of shortness of breath, no ESCOBAR  Breasts:no complaints of breast pain, breast lump, or nipple discharge  Gastrointestinal: no complaints of abdominal pain, constipation, nausea, vomiting, or diarrhea or bloody stools  Genitourinary : no complaints of dysuria, incontinence, pelvic pain, no dysmenorrhea, vaginal discharge or abnormal vaginal bleeding and as noted in HPI  Musculoskeletal: no complaints of arthralgia, no myalgia, no joint swelling or stiffness, no limb pain or swelling  Integumentary: no complaints of skin rash or lesion, itching or dry skin  Neurological: no complaints of headache, no confusion, no numbness or tingling, no dizziness or fainting     Objective     /70   Temp 99 2 °F (37 3 °C) (Tympanic)   Ht 5' (1 524 m)   Wt 44 9 kg (99 lb)   LMP  (LMP Unknown)   BMI 19 33 kg/m²     General Appears stated age, cooperative, alert normal mood and affect   Psychiatric oriented to person, place and time  Mood and affect normal   Neck: normal, supple,trachea midline, no masses  Thyroid: normal, no thyromegaly   Heart: regular rate and rhythm, S1, S2 normal, no murmur, click, rub or gallop   Lungs: clear to auscultation bilaterally, no increased work of breathing or signs of respiratory distress   Breasts: normal, no dimpling or skin changes noted   Abdomen: soft, non-tender, without masses or organomegaly   Vulva:  At opening of vagina, small area hyperemic, cultured, applied antibiotic ointment   Vagina: normal , no lesions or dryness   Urethra: normal   Urethal meatus normal   Bladder Normal, soft, non-tender and no prolapse or masses appreciated   Cervix: normal, no palpable masses    Uterus: normal , non-tender, not enlarged, no palpable masses   Adnexa: normal, non-tender without fullness or masses   Lymphatic Palpation of lymph nodes in neck, axilla, groin and/or other locations: no lymphadenopathy or masses noted   Skin Normal skin turgor and no rashes    Palpation of skin and subcutaneous tissue normal

## 2019-10-31 LAB
APPEARANCE UR: CLEAR
BACTERIA UR QL AUTO: ABNORMAL /HPF
BILIRUB UR QL STRIP: NEGATIVE
COLOR UR: YELLOW
GLUCOSE UR QL STRIP: NEGATIVE
HGB UR QL STRIP: ABNORMAL
HYALINE CASTS #/AREA URNS LPF: ABNORMAL /LPF
KETONES UR QL STRIP: NEGATIVE
LEUKOCYTE ESTERASE UR QL STRIP: ABNORMAL
NITRITE UR QL STRIP: NEGATIVE
PH UR STRIP: 6 [PH] (ref 5–8)
PROT UR QL STRIP: NEGATIVE
RBC #/AREA URNS HPF: ABNORMAL /HPF
SP GR UR STRIP: 1.01 (ref 1–1.03)
SQUAMOUS #/AREA URNS HPF: ABNORMAL /HPF
WBC #/AREA URNS HPF: ABNORMAL /HPF

## 2019-11-01 ENCOUNTER — TELEPHONE (OUTPATIENT)
Dept: OBGYN CLINIC | Facility: MEDICAL CENTER | Age: 84
End: 2019-11-01

## 2019-11-01 NOTE — TELEPHONE ENCOUNTER
I called and spoke with patient about results of urinalysis; her urinary symptoms have improved, but slight burning externally and Vaseline has not helped  Advised to use Desitin ointment

## 2019-11-05 ENCOUNTER — TELEPHONE (OUTPATIENT)
Dept: OBGYN CLINIC | Facility: MEDICAL CENTER | Age: 84
End: 2019-11-05

## 2019-11-05 DIAGNOSIS — A49.1 INFECTION DUE TO BETA-HEMOLYTIC STREPTOCOCCUS: Primary | ICD-10-CM

## 2019-11-05 RX ORDER — PENICILLIN V POTASSIUM 250 MG/1
250 TABLET ORAL 4 TIMES DAILY
Qty: 30 TABLET | Refills: 1 | Status: SHIPPED | OUTPATIENT
Start: 2019-11-05 | End: 2019-11-13

## 2019-11-13 RX ORDER — TRIAMCINOLONE ACETONIDE 1 MG/G
CREAM TOPICAL 2 TIMES DAILY
COMMUNITY
Start: 2019-11-07 | End: 2020-11-10

## 2019-11-14 ENCOUNTER — TELEPHONE (OUTPATIENT)
Dept: OBGYN CLINIC | Facility: MEDICAL CENTER | Age: 84
End: 2019-11-14

## 2019-11-14 ENCOUNTER — PROCEDURE VISIT (OUTPATIENT)
Dept: UROLOGY | Facility: MEDICAL CENTER | Age: 84
End: 2019-11-14
Payer: COMMERCIAL

## 2019-11-14 VITALS
HEIGHT: 60 IN | SYSTOLIC BLOOD PRESSURE: 142 MMHG | DIASTOLIC BLOOD PRESSURE: 72 MMHG | BODY MASS INDEX: 19.44 KG/M2 | WEIGHT: 99 LBS | HEART RATE: 85 BPM

## 2019-11-14 DIAGNOSIS — Z87.440 HISTORY OF ACUTE CYSTITIS: Primary | ICD-10-CM

## 2019-11-14 DIAGNOSIS — B35.9 TINEA: ICD-10-CM

## 2019-11-14 LAB
POST-VOID RESIDUAL VOLUME, ML POC: 58 ML
SL AMB  POCT GLUCOSE, UA: ABNORMAL
SL AMB LEUKOCYTE ESTERASE,UA: ABNORMAL
SL AMB POCT BILIRUBIN,UA: ABNORMAL
SL AMB POCT BLOOD,UA: ABNORMAL
SL AMB POCT CLARITY,UA: CLEAR
SL AMB POCT COLOR,UA: YELLOW
SL AMB POCT KETONES,UA: ABNORMAL
SL AMB POCT NITRITE,UA: ABNORMAL
SL AMB POCT PH,UA: 7
SL AMB POCT SPECIFIC GRAVITY,UA: 1.01
SL AMB POCT URINE PROTEIN: ABNORMAL
SL AMB POCT UROBILINOGEN: 0.2

## 2019-11-14 PROCEDURE — 52000 CYSTOURETHROSCOPY: CPT | Performed by: UROLOGY

## 2019-11-14 PROCEDURE — 51798 US URINE CAPACITY MEASURE: CPT | Performed by: UROLOGY

## 2019-11-14 PROCEDURE — 87086 URINE CULTURE/COLONY COUNT: CPT | Performed by: UROLOGY

## 2019-11-14 PROCEDURE — 99213 OFFICE O/P EST LOW 20 MIN: CPT | Performed by: UROLOGY

## 2019-11-14 PROCEDURE — 81003 URINALYSIS AUTO W/O SCOPE: CPT | Performed by: UROLOGY

## 2019-11-14 RX ORDER — CEPHALEXIN 500 MG/1
500 CAPSULE ORAL EVERY 12 HOURS SCHEDULED
Qty: 6 CAPSULE | Refills: 0 | Status: SHIPPED | OUTPATIENT
Start: 2019-11-14 | End: 2019-11-17

## 2019-11-14 NOTE — PROGRESS NOTES
Cystoscopy  Date/Time: 11/14/2019 3:40 PM  Performed by: Suzanne Cardenas MD  Authorized by: Suzanne Cardenas MD     Procedure details: cystoscopy    Patient tolerance: Patient tolerated the procedure well with no immediate complications    Additional Procedure Details: The patient was placed in lithotomy position and the vaginal introitus prepped with Betadine  Flexible cystourethroscopy revealed a completely normal lower urinary tract  The urethra was within normal limits without stricture lesion  The bladder was free of any intrinsic lesions or evidence of extrinsic mass compression  Ureteral orifices were normal position and configuration bilaterally with clear efflux bilaterally  After completion of the cystoscopy the cystoscope was removed from the patient atraumatically  The patient did identify a small rash that appeared be consistent with yeast on the left posterior portion of the labia majora  Mycolog-II cream will be prescribed  The patient recovered uneventfully and will return to the office after renal ultrasound is obtained to rule out any upper urinary tract abnormality that may be contributing to her back to urea  Urine culture was obtained prior to this cystoscopy to identify any persistence bacteriuria  PVR has been consistently within acceptable limits

## 2019-11-14 NOTE — LETTER
November 14, 2019     Rell Lan MD  [address] [address]    Patient: Shashank Terrazas   YOB: 1933   Date of Visit: 11/14/2019       Dear Dr Lilo Bush: Thank you for referring Bharti Morales to me for evaluation  Below are my notes for this consultation  If you have questions, please do not hesitate to call me  I look forward to following your patient along with you  Sincerely,        Nile Lagos MD        CC: No Recipients  Nile Lagos MD  11/14/2019  3:52 PM  Sign at close encounter  Cystoscopy  Date/Time: 11/14/2019 3:40 PM  Performed by: Nile Lagos MD  Authorized by: Nile Lagos MD     Procedure details: cystoscopy    Patient tolerance: Patient tolerated the procedure well with no immediate complications    Additional Procedure Details: The patient was placed in lithotomy position and the vaginal introitus prepped with Betadine  Flexible cystourethroscopy revealed a completely normal lower urinary tract  The urethra was within normal limits without stricture lesion  The bladder was free of any intrinsic lesions or evidence of extrinsic mass compression  Ureteral orifices were normal position and configuration bilaterally with clear efflux bilaterally  After completion of the cystoscopy the cystoscope was removed from the patient atraumatically  The patient did identify a small rash that appeared be consistent with yeast on the left posterior portion of the labia majora  Mycolog-II cream will be prescribed  The patient recovered uneventfully and will return to the office after renal ultrasound is obtained to rule out any upper urinary tract abnormality that may be contributing to her back to urea  Urine culture was obtained prior to this cystoscopy to identify any persistence bacteriuria  PVR has been consistently within acceptable limits

## 2019-11-14 NOTE — TELEPHONE ENCOUNTER
I returned patient's call and nothing appears to improve the rash on left vulvar area  She is seeing urologist tomorrow for cystoscopy and will ask his opinion

## 2019-11-14 NOTE — TELEPHONE ENCOUNTER
Patient left a message that she was seen for a rash on vagina  Patient said she was prescribed prednisone and she finished all the pills and it did not work  Patient needs something else called in to her pharmacy

## 2019-11-16 LAB — BACTERIA UR CULT: NORMAL

## 2019-11-20 ENCOUNTER — HOSPITAL ENCOUNTER (OUTPATIENT)
Dept: ULTRASOUND IMAGING | Facility: MEDICAL CENTER | Age: 84
Discharge: HOME | End: 2019-11-20
Attending: UROLOGY
Payer: COMMERCIAL

## 2019-11-20 DIAGNOSIS — Z87.440 HISTORY OF ACUTE CYSTITIS: ICD-10-CM

## 2019-11-20 PROCEDURE — 51798 US URINE CAPACITY MEASURE: CPT

## 2019-11-25 ENCOUNTER — TELEPHONE (OUTPATIENT)
Dept: UROLOGY | Facility: MEDICAL CENTER | Age: 84
End: 2019-11-25

## 2019-11-25 NOTE — TELEPHONE ENCOUNTER
Patient Dr Greg Garcia seen in the [facility] office  Patient would like the results of her ultrasound  Please advise

## 2020-01-06 ENCOUNTER — OFFICE VISIT (OUTPATIENT)
Dept: UROLOGY | Facility: MEDICAL CENTER | Age: 85
End: 2020-01-06
Payer: COMMERCIAL

## 2020-01-06 VITALS
SYSTOLIC BLOOD PRESSURE: 134 MMHG | BODY MASS INDEX: 19.83 KG/M2 | HEART RATE: 76 BPM | HEIGHT: 60 IN | WEIGHT: 101 LBS | DIASTOLIC BLOOD PRESSURE: 80 MMHG

## 2020-01-06 DIAGNOSIS — Z87.440 HISTORY OF ACUTE CYSTITIS: Primary | ICD-10-CM

## 2020-01-06 LAB
SL AMB  POCT GLUCOSE, UA: ABNORMAL
SL AMB LEUKOCYTE ESTERASE,UA: ABNORMAL
SL AMB POCT BILIRUBIN,UA: ABNORMAL
SL AMB POCT BLOOD,UA: ABNORMAL
SL AMB POCT CLARITY,UA: CLEAR
SL AMB POCT COLOR,UA: YELLOW
SL AMB POCT KETONES,UA: ABNORMAL
SL AMB POCT NITRITE,UA: ABNORMAL
SL AMB POCT PH,UA: 5
SL AMB POCT SPECIFIC GRAVITY,UA: 1.01
SL AMB POCT URINE PROTEIN: ABNORMAL
SL AMB POCT UROBILINOGEN: 0.2

## 2020-01-06 PROCEDURE — 87086 URINE CULTURE/COLONY COUNT: CPT | Performed by: UROLOGY

## 2020-01-06 PROCEDURE — 99213 OFFICE O/P EST LOW 20 MIN: CPT | Performed by: UROLOGY

## 2020-01-06 PROCEDURE — 81003 URINALYSIS AUTO W/O SCOPE: CPT | Performed by: UROLOGY

## 2020-01-06 NOTE — PROGRESS NOTES
Assessment/Plan:   History of acute cystitis -all symptoms of dysuria for the most part resolved  The patient no longer takes tamsulosin and has no complaints relative to feelings of incomplete bladder emptying or significant frequency urgency  Urine culture will be obtained noted document lack of UTI as the patient had chills the other day and is concerned about possible infection  Diagnoses and all orders for this visit:    History of acute cystitis  -     POCT urine dip auto non-scope  -     Urine culture; Future          Subjective:     Patient ID: José Antonio Ordoñez is a 80 y o  female  HPI    80-year-old female well known to me who presented with dysuria and feelings of incomplete bladder emptying treated for previous bout of acute cystitis with currently urinalysis within acceptable limits denying dysuria frequency or urgency or incontinence  Denies gross hematuria  She is no longer taking alpha blockade  Review of Systems   Gastrointestinal: Positive for constipation  Objective:     Physical Exam   Constitutional: She is oriented to person, place, and time  She appears well-developed and well-nourished  HENT:   Head: Atraumatic  Eyes: EOM are normal    Neck: Neck supple  Cardiovascular: Normal rate  Pulmonary/Chest: Effort normal    Abdominal: Soft  Neurological: She is alert and oriented to person, place, and time  Skin: Skin is dry  Psychiatric: She has a normal mood and affect  Her behavior is normal  Judgment and thought content normal    Vitals reviewed

## 2020-01-08 LAB — BACTERIA UR CULT: NORMAL

## 2020-04-06 ENCOUNTER — DOCUMENTATION (OUTPATIENT)
Dept: OBGYN CLINIC | Facility: MEDICAL CENTER | Age: 85
End: 2020-04-06

## 2020-07-06 RX ORDER — MECLIZINE HYDROCHLORIDE 25 MG/1
25 TABLET ORAL 3 TIMES DAILY
COMMUNITY
Start: 2020-03-22

## 2020-07-07 ENCOUNTER — OFFICE VISIT (OUTPATIENT)
Dept: UROLOGY | Facility: MEDICAL CENTER | Age: 85
End: 2020-07-07
Payer: COMMERCIAL

## 2020-07-07 VITALS
WEIGHT: 101 LBS | DIASTOLIC BLOOD PRESSURE: 80 MMHG | BODY MASS INDEX: 19.83 KG/M2 | SYSTOLIC BLOOD PRESSURE: 130 MMHG | HEART RATE: 84 BPM | HEIGHT: 60 IN

## 2020-07-07 DIAGNOSIS — R39.14 FEELING OF INCOMPLETE BLADDER EMPTYING: Primary | ICD-10-CM

## 2020-07-07 DIAGNOSIS — N30.00 ACUTE CYSTITIS WITHOUT HEMATURIA: ICD-10-CM

## 2020-07-07 LAB
SL AMB  POCT GLUCOSE, UA: NORMAL
SL AMB LEUKOCYTE ESTERASE,UA: NORMAL
SL AMB POCT BILIRUBIN,UA: NORMAL
SL AMB POCT BLOOD,UA: NORMAL
SL AMB POCT CLARITY,UA: CLEAR
SL AMB POCT COLOR,UA: YELLOW
SL AMB POCT KETONES,UA: NORMAL
SL AMB POCT NITRITE,UA: NORMAL
SL AMB POCT PH,UA: 5
SL AMB POCT SPECIFIC GRAVITY,UA: 1.02
SL AMB POCT URINE PROTEIN: NORMAL
SL AMB POCT UROBILINOGEN: 0.2

## 2020-07-07 PROCEDURE — 81003 URINALYSIS AUTO W/O SCOPE: CPT | Performed by: UROLOGY

## 2020-07-07 PROCEDURE — 99213 OFFICE O/P EST LOW 20 MIN: CPT | Performed by: UROLOGY

## 2020-07-07 RX ORDER — NITROFURANTOIN 25; 75 MG/1; MG/1
CAPSULE ORAL
COMMUNITY
Start: 2020-07-02 | End: 2020-11-10

## 2020-07-15 DIAGNOSIS — Z12.31 ENCOUNTER FOR SCREENING MAMMOGRAM FOR MALIGNANT NEOPLASM OF BREAST: Primary | ICD-10-CM

## 2020-08-31 ENCOUNTER — TELEPHONE (OUTPATIENT)
Dept: UROLOGY | Facility: MEDICAL CENTER | Age: 85
End: 2020-08-31

## 2020-08-31 DIAGNOSIS — R30.0 DYSURIA: Primary | ICD-10-CM

## 2020-08-31 RX ORDER — PHENAZOPYRIDINE HYDROCHLORIDE 200 MG/1
200 TABLET, FILM COATED ORAL
Qty: 10 TABLET | Refills: 0 | Status: SHIPPED | OUTPATIENT
Start: 2020-08-31

## 2020-08-31 RX ORDER — NITROFURANTOIN 25; 75 MG/1; MG/1
100 CAPSULE ORAL 2 TIMES DAILY
Qty: 10 CAPSULE | Refills: 0 | Status: SHIPPED | OUTPATIENT
Start: 2020-08-31 | End: 2020-09-05

## 2020-08-31 NOTE — TELEPHONE ENCOUNTER
Prescriptions for Macrobid and Pyridium sent to pharmacy on file  Please ensure the patient is drinking adequate water upwards to 40-60 oz per day and avoiding bladder irritating foods and beverages    Follow up in the office as scheduled

## 2020-08-31 NOTE — TELEPHONE ENCOUNTER
Patient of Dr Adam Barrientos seen at [facility] to empty bladder completely since Saturday  Experiencing pressure and pain       Patient can be reached at [phone]

## 2020-08-31 NOTE — TELEPHONE ENCOUNTER
Pt of Dr Joaquín Hernandez managed at the [facility] office  History of incomplete bladder emptying  Last seen in the office on 7/7/2020  Call placed to patient and spoke with her  She informed me that she has been having bladder pressure and pain since this weekend  She states that she has the urgency to urinate but cannot go at times  She does have chills, but denies fever at this time  Pt stated that she just had urine testing done on Friday 8/28/2020 and the testing was negative  Offered patient appointment with Dr Joaquín Hernandez on Wednesday 9/2/2020 for follow up of symptoms and for plan of care  Pt accepted appointment but is asking if there is anything further she can do or if prescription can be called in at this time until she is seen on Wednesday  Will route to provider for review and recommendations at this time

## 2020-09-02 ENCOUNTER — OFFICE VISIT (OUTPATIENT)
Dept: UROLOGY | Facility: MEDICAL CENTER | Age: 85
End: 2020-09-02
Payer: COMMERCIAL

## 2020-09-02 VITALS
DIASTOLIC BLOOD PRESSURE: 84 MMHG | BODY MASS INDEX: 19.83 KG/M2 | TEMPERATURE: 98.4 F | HEIGHT: 60 IN | SYSTOLIC BLOOD PRESSURE: 128 MMHG | WEIGHT: 101 LBS

## 2020-09-02 DIAGNOSIS — N30.00 ACUTE CYSTITIS WITHOUT HEMATURIA: Primary | ICD-10-CM

## 2020-09-02 DIAGNOSIS — Z87.440 HISTORY OF ACUTE CYSTITIS: ICD-10-CM

## 2020-09-02 LAB
SL AMB  POCT GLUCOSE, UA: NORMAL
SL AMB LEUKOCYTE ESTERASE,UA: NORMAL
SL AMB POCT BILIRUBIN,UA: NORMAL
SL AMB POCT BLOOD,UA: NORMAL
SL AMB POCT CLARITY,UA: CLEAR
SL AMB POCT COLOR,UA: NORMAL
SL AMB POCT KETONES,UA: NORMAL
SL AMB POCT NITRITE,UA: POSITIVE
SL AMB POCT PH,UA: 5
SL AMB POCT SPECIFIC GRAVITY,UA: 1.01
SL AMB POCT URINE PROTEIN: NORMAL
SL AMB POCT UROBILINOGEN: 0.2

## 2020-09-02 PROCEDURE — 81003 URINALYSIS AUTO W/O SCOPE: CPT | Performed by: UROLOGY

## 2020-09-02 PROCEDURE — 87086 URINE CULTURE/COLONY COUNT: CPT | Performed by: UROLOGY

## 2020-09-02 PROCEDURE — 99213 OFFICE O/P EST LOW 20 MIN: CPT | Performed by: UROLOGY

## 2020-09-02 RX ORDER — CEFADROXIL 500 MG/1
CAPSULE ORAL
COMMUNITY

## 2020-09-02 RX ORDER — METOPROLOL TARTRATE 50 MG/1
TABLET, FILM COATED ORAL
COMMUNITY

## 2020-09-02 RX ORDER — AZELASTINE HYDROCHLORIDE, FLUTICASONE PROPIONATE 137; 50 UG/1; UG/1
SPRAY, METERED NASAL
COMMUNITY

## 2020-09-02 RX ORDER — LOSARTAN POTASSIUM 50 MG/1
TABLET ORAL
COMMUNITY

## 2020-09-02 NOTE — PROGRESS NOTES
Chief Complaint: incomplete bladder emptying    History of present illness:  68-year-old female notes a 3 day history feelings if she is not able to urinate I e  urinary tenesmus  She denies any gross hematuria dysuria or incontinence  She was placed on Macrodantin and Pyridium empirically and now notes that she is voiding without difficulty  PVR today was 90 cc  Urinalysis on Macrodantin identifies nitrites and culture was sent while on Macrodantin today  The patient however notes that she is now voiding adequately  Allergies medications past and surgical history medications all reviewed unchanged from record  System review negative except for some urinary urgency suprapubic pressure  Physical examination well-nourished well-developed female appearing stated age  HEENT is normal breathing unlabored  Urinalysis reviewed     Impression acute cystitis treated with Macrodantin    Plan finish prescribed therapy  Urine culture as noted above  Return p jordan n  Jeffrey Meyer

## 2020-09-03 LAB — BACTERIA UR CULT: NORMAL

## 2020-10-06 ENCOUNTER — HOSPITAL ENCOUNTER (OUTPATIENT)
Dept: MAMMOGRAPHY | Facility: MEDICAL CENTER | Age: 85
Discharge: HOME | End: 2020-10-06
Payer: COMMERCIAL

## 2020-10-06 VITALS — HEIGHT: 60 IN | BODY MASS INDEX: 19.83 KG/M2 | WEIGHT: 101 LBS

## 2020-10-06 DIAGNOSIS — Z12.31 ENCOUNTER FOR SCREENING MAMMOGRAM FOR MALIGNANT NEOPLASM OF BREAST: ICD-10-CM

## 2020-10-06 PROCEDURE — 77067 SCR MAMMO BI INCL CAD: CPT

## 2020-10-06 PROCEDURE — 77063 BREAST TOMOSYNTHESIS BI: CPT

## 2020-11-05 ENCOUNTER — TELEPHONE (OUTPATIENT)
Dept: UROLOGY | Facility: MEDICAL CENTER | Age: 85
End: 2020-11-05

## 2020-11-05 ENCOUNTER — LAB (OUTPATIENT)
Dept: LAB | Facility: MEDICAL CENTER | Age: 85
End: 2020-11-05
Attending: UROLOGY
Payer: COMMERCIAL

## 2020-11-05 DIAGNOSIS — R10.9 LEFT FLANK PAIN: Primary | ICD-10-CM

## 2020-11-05 DIAGNOSIS — R30.0 DYSURIA: ICD-10-CM

## 2020-11-05 DIAGNOSIS — R10.9 LEFT FLANK PAIN: ICD-10-CM

## 2020-11-05 LAB
BACTERIA UR QL AUTO: NORMAL /HPF
BILIRUB UR QL STRIP: NEGATIVE
CLARITY UR: CLEAR
COLOR UR: YELLOW
GLUCOSE UR STRIP-MCNC: NEGATIVE MG/DL
HGB UR QL STRIP.AUTO: NEGATIVE
HYALINE CASTS #/AREA URNS LPF: NORMAL /LPF
KETONES UR STRIP-MCNC: NEGATIVE MG/DL
LEUKOCYTE ESTERASE UR QL STRIP: NEGATIVE
NITRITE UR QL STRIP: NEGATIVE
NON-SQ EPI CELLS URNS QL MICRO: NORMAL /HPF
PH UR STRIP.AUTO: 5.5 [PH]
PROT UR STRIP-MCNC: NEGATIVE MG/DL
RBC #/AREA URNS AUTO: NORMAL /HPF
SP GR UR STRIP.AUTO: 1.02 (ref 1–1.03)
UROBILINOGEN UR QL STRIP.AUTO: 0.2 E.U./DL
WBC #/AREA URNS AUTO: NORMAL /HPF

## 2020-11-05 PROCEDURE — 81001 URINALYSIS AUTO W/SCOPE: CPT

## 2020-11-05 PROCEDURE — 87086 URINE CULTURE/COLONY COUNT: CPT

## 2020-11-06 LAB — BACTERIA UR CULT: NORMAL

## 2020-11-10 ENCOUNTER — OFFICE VISIT (OUTPATIENT)
Dept: UROLOGY | Facility: MEDICAL CENTER | Age: 85
End: 2020-11-10
Payer: COMMERCIAL

## 2020-11-10 VITALS
HEIGHT: 60 IN | TEMPERATURE: 98.1 F | SYSTOLIC BLOOD PRESSURE: 106 MMHG | HEART RATE: 98 BPM | WEIGHT: 101 LBS | DIASTOLIC BLOOD PRESSURE: 78 MMHG | BODY MASS INDEX: 19.83 KG/M2

## 2020-11-10 DIAGNOSIS — M54.50 CHRONIC LEFT-SIDED LOW BACK PAIN WITHOUT SCIATICA: Primary | ICD-10-CM

## 2020-11-10 DIAGNOSIS — G89.29 CHRONIC LEFT-SIDED LOW BACK PAIN WITHOUT SCIATICA: Primary | ICD-10-CM

## 2020-11-10 PROCEDURE — 99213 OFFICE O/P EST LOW 20 MIN: CPT | Performed by: UROLOGY

## 2021-01-28 DIAGNOSIS — R10.9 LEFT FLANK PAIN: Primary | ICD-10-CM

## 2021-01-28 RX ORDER — TAMSULOSIN HYDROCHLORIDE 0.4 MG/1
0.4 CAPSULE ORAL
Qty: 90 CAPSULE | Refills: 0 | Status: SHIPPED | OUTPATIENT
Start: 2021-01-28

## 2021-01-28 RX ORDER — TAMSULOSIN HYDROCHLORIDE 0.4 MG/1
0.4 CAPSULE ORAL
COMMUNITY
End: 2021-01-28 | Stop reason: SDUPTHER

## 2021-01-28 NOTE — TELEPHONE ENCOUNTER
Message on med refill VM from pt requesting refill of tamsulosin  Was last seen 11/10/20 by Dr Paty Odonnell in the [facility] location  Pt is to return PRN   Refill queued and routed to [facility] covering for approval

## 2021-04-07 ENCOUNTER — TRANSCRIBE ORDERS (OUTPATIENT)
Dept: ADMINISTRATIVE | Facility: HOSPITAL | Age: 86
End: 2021-04-07

## 2021-04-07 ENCOUNTER — HOSPITAL ENCOUNTER (OUTPATIENT)
Dept: BONE DENSITY | Facility: MEDICAL CENTER | Age: 86
Discharge: HOME | End: 2021-04-07
Payer: COMMERCIAL

## 2021-04-07 DIAGNOSIS — Z78.0 POSTMENOPAUSE: ICD-10-CM

## 2021-04-07 DIAGNOSIS — Z78.0 POSTMENOPAUSE: Primary | ICD-10-CM

## 2021-04-07 PROCEDURE — 77080 DXA BONE DENSITY AXIAL: CPT

## 2022-02-02 ENCOUNTER — TELEPHONE (OUTPATIENT)
Dept: OBGYN CLINIC | Facility: MEDICAL CENTER | Age: 87
End: 2022-02-02

## 2022-02-14 ENCOUNTER — HOSPITAL ENCOUNTER (OUTPATIENT)
Dept: MAMMOGRAPHY | Facility: MEDICAL CENTER | Age: 87
Discharge: HOME | End: 2022-02-14
Payer: COMMERCIAL

## 2022-02-14 VITALS — WEIGHT: 101 LBS | HEIGHT: 60 IN | BODY MASS INDEX: 19.83 KG/M2

## 2022-02-14 DIAGNOSIS — Z12.31 ENCOUNTER FOR SCREENING MAMMOGRAM FOR MALIGNANT NEOPLASM OF BREAST: ICD-10-CM

## 2022-02-14 PROCEDURE — 77067 SCR MAMMO BI INCL CAD: CPT

## 2022-02-14 PROCEDURE — 77063 BREAST TOMOSYNTHESIS BI: CPT

## 2023-03-01 NOTE — PROGRESS NOTES
Chief complaint:  Feeling of incomplete bladder emptying recent acute cystitis    History of present illness:  80year-old female with a history of feelings of incomplete bladder emptying on tamsulosin 0 4 mg p o  Daily with improvement in her ability to empty  She noted the development of feelings of incomplete bladder emptying and low back pain without marlee dysuria  [facility] and was placed empirically on Macrodantin with resolution of her symptoms  No urinalysis or urine culture was obtained  She denied gross blood in the urine and urinary incontinence but notes that her symptoms have resolved and she is currently voiding well  PVR is 0  Allergies medications past medical surgical history unchanged from prior visit  System review negative except for occasional myalgias in lower back pain  All else is within acceptable limits  Physical examination   well-nourished well-developed female in no apparent distress   HEENT within normal limits   pulmonary unlabored   abdomen benign     urinalysis normal without cellularity or sign of infection     plan     continue present regimen  Negative Screen

## 2024-03-07 ENCOUNTER — OFFICE VISIT (OUTPATIENT)
Dept: OBGYN CLINIC | Facility: MEDICAL CENTER | Age: 89
End: 2024-03-07
Payer: COMMERCIAL

## 2024-03-07 VITALS
HEIGHT: 60 IN | DIASTOLIC BLOOD PRESSURE: 84 MMHG | SYSTOLIC BLOOD PRESSURE: 132 MMHG | BODY MASS INDEX: 19.73 KG/M2 | WEIGHT: 100.5 LBS

## 2024-03-07 DIAGNOSIS — Z01.419 WELL WOMAN EXAM WITH ROUTINE GYNECOLOGICAL EXAM: Primary | ICD-10-CM

## 2024-03-07 PROBLEM — I48.91 A-FIB (HCC): Status: ACTIVE | Noted: 2022-10-13

## 2024-03-07 PROCEDURE — G0101 CA SCREEN;PELVIC/BREAST EXAM: HCPCS | Performed by: NURSE PRACTITIONER

## 2024-03-07 RX ORDER — ESCITALOPRAM OXALATE 5 MG/1
TABLET ORAL
COMMUNITY
Start: 2023-12-11

## 2024-03-07 RX ORDER — LIDOCAINE 50 MG/G
1 PATCH TOPICAL EVERY 24 HOURS
COMMUNITY
Start: 2024-02-29

## 2024-03-07 RX ORDER — ATORVASTATIN CALCIUM 20 MG/1
TABLET, FILM COATED ORAL
COMMUNITY
Start: 2024-01-15

## 2024-03-07 RX ORDER — LOSARTAN POTASSIUM 25 MG/1
TABLET ORAL
COMMUNITY
Start: 2024-01-22

## 2024-03-07 RX ORDER — TEMAZEPAM 15 MG/1
CAPSULE ORAL
COMMUNITY
Start: 2024-02-12

## 2024-03-07 RX ORDER — TIOTROPIUM BROMIDE INHALATION SPRAY 3.12 UG/1
2 SPRAY, METERED RESPIRATORY (INHALATION) DAILY
COMMUNITY
Start: 2023-12-27

## 2024-07-24 ENCOUNTER — OFFICE VISIT (OUTPATIENT)
Dept: OBGYN CLINIC | Facility: MEDICAL CENTER | Age: 89
End: 2024-07-24
Payer: COMMERCIAL

## 2024-07-24 VITALS
WEIGHT: 99.6 LBS | BODY MASS INDEX: 19.56 KG/M2 | DIASTOLIC BLOOD PRESSURE: 60 MMHG | SYSTOLIC BLOOD PRESSURE: 110 MMHG | HEIGHT: 60 IN

## 2024-07-24 DIAGNOSIS — R14.0 BLOATING: Primary | ICD-10-CM

## 2024-07-24 PROCEDURE — 99212 OFFICE O/P EST SF 10 MIN: CPT | Performed by: OBSTETRICS & GYNECOLOGY

## 2024-07-25 ENCOUNTER — HOSPITAL ENCOUNTER (OUTPATIENT)
Dept: ULTRASOUND IMAGING | Facility: MEDICAL CENTER | Age: 89
Discharge: HOME | End: 2024-07-25
Payer: COMMERCIAL

## 2024-07-25 DIAGNOSIS — R14.0 BLOATING: ICD-10-CM

## 2024-07-25 PROCEDURE — 76830 TRANSVAGINAL US NON-OB: CPT

## 2024-07-25 PROCEDURE — 76856 US EXAM PELVIC COMPLETE: CPT

## 2024-07-29 ENCOUNTER — NURSE TRIAGE (OUTPATIENT)
Age: 89
End: 2024-07-29

## 2024-07-29 NOTE — TELEPHONE ENCOUNTER
Pt called again to see if US has been reviewed.  She was advised it had not been reviewed yet.  Pt advised it was ok to leave message on machine as she had to go outside to cut the grass before it rains.  No further questions or concerns at this time.

## 2024-07-29 NOTE — TELEPHONE ENCOUNTER
"Patient calling to review results of pelvic ultrasound. Results not yet review by provider.  Advised will call patient back once Dr. Simon has review. Message to Dr. Simon.      Reason for Disposition   Information only question and nurse able to answer    Answer Assessment - Initial Assessment Questions  1. REASON FOR CALL or QUESTION: \"What is your reason for calling today?\" or \"How can I best help you?\" or \"What question do you have that I can help answer?\"      Would like to review results of pelvic ultrasound.    Protocols used: Information Only Call - No Triage-ADULT-[place]    "

## 2024-07-30 ENCOUNTER — TELEPHONE (OUTPATIENT)
Age: 89
End: 2024-07-30

## 2024-07-30 NOTE — TELEPHONE ENCOUNTER
Spoke with patient regarding US results noted below.  No further questions or concerns at this time.     The ovaries look good no size of any masses we are good tess

## 2024-08-16 NOTE — PROGRESS NOTES
A/P     Pain on the left side and she has a fear that she is having symptoms of ovarian cancer    She had preivous imaging     9/21/23- MRI - normal   10/2/2022- CT - normal     She does report that its more bloating then pain   No bleeding   No early satiety   No pressure    I explained that I have a low threshold of suspicious.    We will get sonogram

## 2025-01-17 ENCOUNTER — TELEPHONE (OUTPATIENT)
Age: OVER 89
End: 2025-01-17

## 2025-01-17 NOTE — TELEPHONE ENCOUNTER
Patient called to schedule yearly. Insurance was updated, [facility] out of network with patient's insurance. Informed patient on outbound call, appointment canceled. Patient also mentioned her insurance will be contacting the practice to confirm a prescribed medication.